# Patient Record
Sex: MALE | Race: ASIAN | NOT HISPANIC OR LATINO | Employment: FULL TIME | URBAN - METROPOLITAN AREA
[De-identification: names, ages, dates, MRNs, and addresses within clinical notes are randomized per-mention and may not be internally consistent; named-entity substitution may affect disease eponyms.]

---

## 2020-05-16 ENCOUNTER — OFFICE VISIT - HEALTHEAST (OUTPATIENT)
Dept: FAMILY MEDICINE | Facility: CLINIC | Age: 35
End: 2020-05-16

## 2020-05-16 ENCOUNTER — AMBULATORY - HEALTHEAST (OUTPATIENT)
Dept: FAMILY MEDICINE | Facility: CLINIC | Age: 35
End: 2020-05-16

## 2020-05-16 DIAGNOSIS — Z20.822 EXPOSURE TO COVID-19 VIRUS: ICD-10-CM

## 2020-05-17 ENCOUNTER — COMMUNICATION - HEALTHEAST (OUTPATIENT)
Dept: FAMILY MEDICINE | Facility: CLINIC | Age: 35
End: 2020-05-17

## 2020-05-17 ENCOUNTER — COMMUNICATION - HEALTHEAST (OUTPATIENT)
Dept: EMERGENCY MEDICINE | Facility: CLINIC | Age: 35
End: 2020-05-17

## 2020-05-18 ENCOUNTER — COMMUNICATION - HEALTHEAST (OUTPATIENT)
Dept: FAMILY MEDICINE | Facility: CLINIC | Age: 35
End: 2020-05-18

## 2020-06-01 ENCOUNTER — COMMUNICATION - HEALTHEAST (OUTPATIENT)
Dept: SCHEDULING | Facility: CLINIC | Age: 35
End: 2020-06-01

## 2021-03-04 ENCOUNTER — COMMUNICATION - HEALTHEAST (OUTPATIENT)
Dept: SCHEDULING | Facility: CLINIC | Age: 36
End: 2021-03-04

## 2021-06-08 NOTE — TELEPHONE ENCOUNTER
Coronavirus (COVID-19) Notification    Patient  Rodney Kim    Reason for call  Notify of Positive Coronavirus (COVID-19) lab results, assess symptoms,  review [facility] recommendations    Lab Result    Lab test:  2019-nCoV rRt-PCR or SARS-CoV-2 PCR    Oropharyngeal AND/OR nasopharyngeal swabs is POSITIVE for 2019-nCoV RNA/SARS-COV-2 PCR (COVID-19 virus)    RN Recommendations/Instructions per [facility] Coronavirus COVID-19 recommendations    Brief introduction script  Hi, My name is Xi JOE and I am calling on behalf of [employer].  We were notified that your Coronavirus test (COVID-19) for was POSITIVE for the virus.  I have some information to relay to you but first I wanted to mention that the [place] Dept of Health will be contacting you shortly [it's possible MD already called Patient] to talk to you more about how you are feeling and other people you have had contact with who might now also have the virus.  Also, [facility] is Partnering with the [facility] for Covid-19 research, you may be contacted directly by research staff.    Assessment (Inquire about Patient's current symptoms)  Pt is asymptomatic.  He lives with his significant other and his mother in law.  Pt's MIL tested positive for corona virus so it was advised that he too be tested.  Of note, pt has a  baby who is in the hospital.     If at time of call, Patients symptoms hare worsened, the Patient should contact 911 or have someone drive them to Emergency Dept promptly:      If Patient calling 911, inform 911 personal that you have tested positive for the Coronavirus (COVID-19).  Place mask on and await 911 to arrive.    If Emergency Dept, If possible, please have another adult drive you to the Emergency Dept but you need to wear mask when in contact with other people.      Review information with Patient    Since you tested POSITIVE for the COVID-19 virus, it is important that you protect others from being  exposed and infected with this virus.    [For safety, it's very important to follow these rules.]    First, stay home and away from others (self-isolate) until:    You've had no fever--and no medicine that reduces fever--for 3 full days (72 hours). And      Your other symptoms have gotten better. For example, your cough or breathing has improved. And     At least 10 days have passed since your symptoms started.    During this time:    Stay in your own room (and use your own bathroom), if you can.    Stay away from others in your home. No hugging, kissing or shaking hands.    Don't let anyone visit.    Don't go to work, school or anywhere else.     Clean  high touch  surfaces often (doorknobs, counters, handles, etc.). Use a household cleaning spray or wipes.    Cover your mouth and nose with a mask, tissue or washcloth to avoid spreading germs.    Wash your hands and face often with soap and water.    You should not go back to work until you meet the guidelines above for ending your home isolation. You should meet these along with any other guidelines that your employer has.  Employers: This document serves as formal notice of your employee's medical guidelines for going back to work. They must meet the above guidelines before going back to work in person.    How can I take care of myself?  1. Get lots of rest. Drink extra fluids (unless a doctor has told you not to).    2. Take Tylenol (acetaminophen) for fever or pain. If you have liver or kidney problems, ask your family doctor if it's okay to take Tylenol.     Take either:     650 mg (two 325 mg pills) every 4 to 6 hours, or     1,000 mg (two 500 mg pills) every 8 hours as needed.     Note: Don't take more than 3,000 mg in one day. Acetaminophen is found in many medicines (both prescribed and over-the-counter medicines). Read all labels to be sure you don't take too much.  For children, check the Tylenol bottle for the right dose. The dose is based on the  child's age or weight.  3. If you have other health problems (like cancer, heart failure, an organ transplant or severe kidney disease): Call your specialty clinic if you don't feel better in the next 2 days.    4. Know when to call 911: If your breathing is so bad that it keeps you from doing normal activities, call 911 or go to the emergency room. Tell them that you've been staying home and may have COVID-19.  5. Sign up for [employer]. We know it's scary to hear that you have COVID-19. We want to track your symptoms to make sure you're okay over the next 2 weeks. Please look for an email from [employer]--this is a free, online program that we'll use to keep in touch. To sign up, follow the link in the email. Learn more at [email or web address].    Where can I get more information?    To learn the [place]'s guidelines for staying home, please visit the [facility] of Health website at [email or web address].[facility].[place]/diseases/coronavirus/basics.html.    To learn more about COVID-19 and how to care for yourself at home, please visit the CDC website at https://www.cdc.gov/coronavirus/2019-ncov/about/steps-when-sick.html.    For more options for care at [facility], please visit our website at [email or web address]      [place] Dept of Health ([facility]) COVID-19 Hotline:   [phone]    Positive COVID-19 letter Sent (Yes/Yumi):  YES    Pt verbalized understanding of when to call MD and also self isolation protoccol    [Name]  Xi Castro RN

## 2021-06-08 NOTE — TELEPHONE ENCOUNTER
Had been Covid positive, tested on 5/16/20.  Requesting some form of letter as a return to work.  Has been quarantined for two weeks.  Did not ever have symptoms.  Advised that the positive letter that he had received should have criteria of when a person can discontinue quarantine.  The date of the test is on the paper, and he has been asymptomatic and quarantined for 14 days.  Advised that he could call back if his employers had an issue.      Coronavirus (COVID-19) Notification    Lab Result   Lab test 2019-nCoV rRt-PCR OR SARS-COV-2 PCR    Nasopharyngeal AND/OR Oropharyngeal swab is NEGATIVE for 2019-nCoV RNA [OR] SARS-COV-2 RNA (COVID-19) RNA    Your result was negative. This means that we didn't find the virus that causes COVID-19 in your sample.   A test may show negative when you do actually have the virus. This can happen when the virus is in the early stages of infection, before you feel illness symptoms.  Even if you don't have symptoms, they may still appear. For safety, it's very important to follow these rules.  Keep yourself away from others (self-isolation):    Stay home. Don't go to work, school or anywhere else.     Stay in your own room (and use your own bathroom), if you can.    Stay away from others in your home. No hugging, kissing or shaking hands. No visitors.    Clean  high touch  surfaces often (doorknobs, counters, handles, etc.). Use a household cleaning spray or wipes.    Cover your mouth and nose with a mask, tissue or washcloth to avoid spreading germs.    Wash your hands and face often with soap and water.  Stay in self-isolation until you meet ALL of the guidelines below:  1. You have had no fever for at least 72 hours (that is 3 full days of no fever without the use of medicine that reduces fevers), AND  2. other symptoms (such as cough, shortness of breath) have gotten better, AND  3. at least 10 days have passed since your symptoms first appeared.  Going back to work  Check with  your employer for any guidelines to follow for going back to work.  Employers: This document serves as formal notice that your employee tested negative for COVID-19, as of the testing date shown above.    If your symptoms worsen or other concerning symptoms, contact PCP, oncare or consider returning to Emergency Dept.    For questions regarding COVID19, Patient can access more information at:    https://www.cdc.gov/coronavirus/2019-ncov/faq.html    https://www.health.[facility].[place]/diseases/coronavirus/index.html    [facility] Hotline ([phone])

## 2021-06-20 NOTE — LETTER
Letter by Xi Castro RN at      Author: Xi Castro RN Service: -- Author Type: --    Filed:  Encounter Date: 5/17/2020 Status: (Other)       5/17/2020        Rodney Kim  [address]  [place] [address]    This letter provides a written record that you were tested for COVID-19 on 5/16/20.     Your result was positive.     This means that we found the virus that causes COVID-19 in your sample.    How can I protect others?    For safety, its very important to follow these rules.    First, stay home and away from others (self-isolate) until:      Youve had no fever--and no medicine that reduces fever--for 3 full days (72 hours). And?     Your other symptoms have gotten better. For example, your cough or breathing has improved. And?    At least 10 days have passed since your symptoms started.    During this time:      Stay in your own room (and use your own bathroom), if you can.    Stay away from others in your home. No hugging, kissing or shaking hands.    Dont let anyone visit.    Dont go to work, school or anywhere else.     Clean high touch surfaces often (doorknobs, counters, handles, etc.). Use a household cleaning spray or wipes.    Cover your mouth and nose with a mask, tissue or washcloth to avoid spreading germs.    Wash your hands and face often with soap and water.    You should not go back to work until you meet the guidelines above for ending your home isolation. You should meet these along with any other guidelines that your employer has.    Employers: This document serves as formal notice of your employees medical guidelines for going back to work. They must meet the above guidelines before going back to work in person.    How can I take care of myself?    1. Get lots of rest. Drink extra fluids (unless a doctor has told you not to).    2. Take Tylenol (acetaminophen) for fever or pain. If you have liver or kidney problems, ask your family doctor if its okay to take Tylenol.      Take either:     650 mg (two 325 mg pills) every 4 to 6 hours, or?    1,000 mg (two 500 mg pills) every 8 hours as needed.     Note: Dont take more than 3,000 mg in one day. Acetaminophen is found in many medicines (both prescribed and over-the-counter medicines). Read all labels to be sure you dont take too much.  For children, check the Tylenol bottle for the right dose. The dose is based on the radha age or weight.    1. If you have other health problems (like cancer, heart failure, an organ transplant or severe kidney disease): Call your specialty clinic if you dont feel better in the next 2 days.    2. Know when to call 911: If your breathing is so bad that it keeps you from doing normal activities, call 911 or go to the emergency room. Tell them that youve been staying home and may have COVID-19.    3. Sign up for [employer]. We know its scary to hear that you have COVID-19. We want to track your symptoms to make sure youre okay over the next 2 weeks. Please look for an email from [employer]--this is a free, online program that well use to keep in touch. To sign up, follow the link in the email. Learn more at [email or web address].    4. Interested is participating in research? Visit the link below to view current clinical trials that apply to your situation:  [email or web address]/[facility]-clinical-trials    Where can I get more information?    To learn the [facility] guidelines for staying home, please visit the [place] Department of Health website at https://www.health.state.[place]/diseases/coronavirus/basics.html.    To learn more about COVID-19 and how to care for yourself at home, please visit the CDC website at https://www.cdc.gov/coronavirus/2019-ncov/about/steps-when-sick.html.    For more options for care at [facility], please visit our website at [email or web address]

## 2023-04-09 ENCOUNTER — HOSPITAL ENCOUNTER (EMERGENCY)
Facility: HOSPITAL | Age: 38
Discharge: HOME | End: 2023-04-10
Attending: EMERGENCY MEDICINE | Admitting: EMERGENCY MEDICINE
Payer: COMMERCIAL

## 2023-04-09 DIAGNOSIS — S62.91XA CLOSED FRACTURE OF RIGHT HAND, INITIAL ENCOUNTER: ICD-10-CM

## 2023-04-09 PROCEDURE — 99284 EMERGENCY DEPT VISIT MOD MDM: CPT | Mod: 25

## 2023-04-09 PROCEDURE — 26600 TREAT METACARPAL FRACTURE: CPT | Mod: RT

## 2023-04-10 ENCOUNTER — APPOINTMENT (OUTPATIENT)
Dept: RADIOLOGY | Facility: HOSPITAL | Age: 38
End: 2023-04-10
Attending: EMERGENCY MEDICINE
Payer: COMMERCIAL

## 2023-04-10 VITALS
BODY MASS INDEX: 33.13 KG/M2 | WEIGHT: 180 LBS | OXYGEN SATURATION: 100 % | HEIGHT: 62 IN | DIASTOLIC BLOOD PRESSURE: 72 MMHG | TEMPERATURE: 97.6 F | SYSTOLIC BLOOD PRESSURE: 124 MMHG | RESPIRATION RATE: 20 BRPM | HEART RATE: 102 BPM

## 2023-04-10 PROCEDURE — 73130 X-RAY EXAM OF HAND: CPT | Mod: RT

## 2023-04-10 PROCEDURE — 250N000013 HC RX MED GY IP 250 OP 250 PS 637: Performed by: EMERGENCY MEDICINE

## 2023-04-10 RX ORDER — IBUPROFEN 600 MG/1
600 TABLET, FILM COATED ORAL EVERY 6 HOURS PRN
Qty: 30 TABLET | Refills: 0 | Status: SHIPPED | OUTPATIENT
Start: 2023-04-10

## 2023-04-10 RX ORDER — OXYCODONE HYDROCHLORIDE 5 MG/1
5 TABLET ORAL EVERY 6 HOURS PRN
Qty: 6 TABLET | Refills: 0 | Status: SHIPPED | OUTPATIENT
Start: 2023-04-10 | End: 2023-04-13

## 2023-04-10 RX ORDER — IBUPROFEN 600 MG/1
600 TABLET, FILM COATED ORAL ONCE
Status: COMPLETED | OUTPATIENT
Start: 2023-04-10 | End: 2023-04-10

## 2023-04-10 RX ADMIN — IBUPROFEN 600 MG: 600 TABLET, FILM COATED ORAL at 00:18

## 2023-04-10 ASSESSMENT — ENCOUNTER SYMPTOMS: WOUND: 1

## 2023-04-10 ASSESSMENT — ACTIVITIES OF DAILY LIVING (ADL): ADLS_ACUITY_SCORE: 35

## 2023-04-10 NOTE — ED PROVIDER NOTES
EMERGENCY DEPARTMENT ENCOUNTER      NAME: Rodney Kim  AGE: 37 year old male  YOB: 1985  MRN: [id number]  EVALUATION DATE & TIME: 4/9/2023 11:55 PM    PCP: No primary care provider on file.    ED PROVIDER: Dominique Munson M.D.      Chief Complaint   Patient presents with     Hand Injury         FINAL IMPRESSION:  1. Closed fracture of right hand, initial encounter        MEDICAL DECISION MAKING:    Pertinent Labs & Imaging studies reviewed. (See chart for details)  ED Course as of 04/10/23 0104   Mon Apr 10, 2023   0006 Afebrile.  Vital signs here mild tachycardia, otherwise unremarkable.  Patient was doing push-ups at home and had a 30 pound weight that was sitting up on its side and fell onto his right hand.  Happened just prior to arrival.  Has a small abrasion to the back of his hand as well.  No other injury.    Physical exam for patient here with slight swelling to the dorsum of his right hand at the base of his fifth finger with small abrasion noted in this area.  Has tenderness palpation at the base of his fifth finger.  No tenderness in his wrist, able to move first through fourth fingers without difficulty.  No other significant tenderness or swelling to the dorsum of his hand.    We will get an x-ray.  We will give ibuprofen.   0104 Patient's x-ray here shows a mildly displaced fifth metacarpal shaft fracture.  There is volar angulation.    This is the area where patient's pain is Tendinitis.  Due to a uploading area I am unable to see the actual images but this is per the radiology read and physical exam that correlates with this read.  Placed in an ulnar gutter splint and discharged with follow-up with [facility].         Medical Decision Making    History:    Supplemental history from: Documented in chart, if applicable    External Record(s) reviewed: Documented in chart, if applicable.    Work Up:    Chart documentation includes differential considered and any EKGs or imaging  independently interpreted by provider, where specified.    In additional to work up documented, I considered the following work up: Documented in chart, if applicable.    External consultation:    Discussion of management with another provider: Documented in chart, if applicable    Complicating factors:    Care impacted by chronic illness: N/A    Care affected by social determinants of health: N/A    Disposition considerations: Discharge. I prescribed additional prescription strength medication(s) as charted. N/A.      Critical care: 0 minutes excluding separately billable procedures.  Includes bedside management, time reviewing test results, review of records, discussing the case with staff, documenting the medical record and time spent with family members (or surrogate decision makers) discussing specific treatment issues.        ED COURSE:  12:03 AM I met with the patient, obtained history, performed an initial exam, and discussed options and plan for diagnostics and treatment here in the ED.   12:46 AM Reassessed and updated patient on his results. Performed splint procedure on patient's right hand.   1:00 AM I discussed the plan for discharge with the patient, and patient is agreeable. We discussed supportive cares at home and reasons for return to the ER including new or worsening symptoms - all questions and concerns addressed. Patient to be discharged by RN.      The importance of close follow up was discussed. We reviewed warning signs and symptoms, and I instructed Mr. Kim to return to the emergency department immediately if he develops any new or worsening symptoms. I provided additional verbal discharge instructions. Mr. Kim expressed understanding and agreement with this plan of care, his questions were answered, and he was discharged in stable condition.     MEDICATIONS GIVEN IN THE EMERGENCY:  Medications   ibuprofen (ADVIL/MOTRIN) tablet 600 mg (600 mg Oral $Given 4/10/23 0018)       NEW  PRESCRIPTIONS STARTED AT TODAY'S ER VISIT:  New Prescriptions    IBUPROFEN (ADVIL/MOTRIN) 600 MG TABLET    Take 1 tablet (600 mg) by mouth every 6 hours as needed for moderate pain    OXYCODONE (ROXICODONE) 5 MG TABLET    Take 1 tablet (5 mg) by mouth every 6 hours as needed for severe pain          =================================================================    HPI    Patient information was obtained from: Patient     Use of : N/A       Rodney Kim is a 37 year old male who presents with right hand injury. Patient reports he was doing pushups on the floor when a 30 pound weight that was sitting on its side fell over his right hand just prior to ED arrival. Patient reports having pain with movement in right pinky finger. Patient has a small laceration on top of his pinky knuckle. Bleeding is currently controlled. Patient denies any additional complaints at this time.     REVIEW OF SYSTEMS   Review of Systems   Musculoskeletal:        Positive for right hand pain.    Skin: Positive for wound.   All other systems reviewed and are negative.        PAST MEDICAL HISTORY:  No past medical history on file.    PAST SURGICAL HISTORY:  No past surgical history on file.    CURRENT MEDICATIONS:    No current facility-administered medications for this encounter.    Current Outpatient Medications:      ibuprofen (ADVIL/MOTRIN) 600 MG tablet, Take 1 tablet (600 mg) by mouth every 6 hours as needed for moderate pain, Disp: 30 tablet, Rfl: 0     oxyCODONE (ROXICODONE) 5 MG tablet, Take 1 tablet (5 mg) by mouth every 6 hours as needed for severe pain, Disp: 6 tablet, Rfl: 0     dicyclomine (BENTYL) 20 mg tablet, [DICYCLOMINE (BENTYL) 20 MG TABLET] Take 1 tablet (20 mg total) by mouth 3 (three) times a day as needed (abdominal cramping)., Disp: 21 tablet, Rfl: 0     ondansetron (ZOFRAN-ODT) 4 MG disintegrating tablet, [ONDANSETRON (ZOFRAN-ODT) 4 MG DISINTEGRATING TABLET] Take 1 tablet (4 mg total) by mouth every 8  "(eight) hours as needed for nausea., Disp: 24 tablet, Rfl: 0    ALLERGIES:  No Known Allergies    FAMILY HISTORY:  No family history on file.    SOCIAL HISTORY:   Social History     Socioeconomic History     Marital status: Single   Social History Narrative    Patient lives alone       PHYSICAL EXAM:    Vitals: BP (!) 158/90   Pulse 102   Temp 97.6  F (36.4  C) (Temporal)   Resp 18   Ht 1.575 m (5' 2\")   Wt 81.6 kg (180 lb)   SpO2 98%   BMI 32.92 kg/m     General:. Alert and interactive, comfortable appearing.  HENT: Oropharynx without erythema or exudates. MMM.  TMs clear bilaterally.  Eyes: Pupils mid-sized and equally reactive.   Neck: Full AROM.  No midline tenderness to palpation.  Cardiovascular: Regular rate and rhythm. Peripheral pulses 2+ bilaterally.  Chest/Pulmonary: Normal work of breathing. Lung sounds clear and equal throughout, no wheezes or crackles. No chest wall tenderness or deformities.  Abdomen: Soft, nondistended. Nontender without guarding or rebound.  Back/Spine: No CVA or midline tenderness.  Extremities: Slight swelling to the dorsum of his right hand at the base of his fifth finger with small abrasion noted in this area.  Has tenderness palpation at the base of his fifth finger.  No tenderness in his wrist, able to move first through fourth fingers without difficulty.  No other significant tenderness or swelling to the dorsum of his hand. No lower extremity edema.   Skin: Warm and dry. Normal skin color.   Neuro: Speech clear. CNs grossly intact. Moves all extremities appropriately. Strength and sensation grossly intact to all extremities.   Psych: Normal affect/mood, cooperative, memory appropriate.       RADIOLOGY:  XR Hand Right G/E 3 Views   Final Result   IMPRESSION: Mildly displaced fifth metacarpal shaft fracture with volar angulation.          PROCEDURES:     PROCEDURE: Splint Placement   INDICATIONS: right fifth metacarpal shaft fracture   PROCEDURE PROVIDER: Dr Shannan Munson "   NOTE:  A Ulnar gutter splint made of Orthoglass was applied to the Right upper extremity by the above provider. As noted in the physical exam, distal CMS was intact prior to placement. The splint was checked and the fit was adjusted to ensure proper positioning after placement. Sensation and circulation, as well as motor function, are unchanged after splint placement and the patient is more comfortable with the splint in place.           I, Ghanshyam Godoy, am serving as a scribe to document services personally performed by Dr. Dominique Munson  based on my observation and the provider's statements to me. I, Dominique Munson MD attest that Ghanshyam Godoy is acting in a scribe capacity, has observed my performance of the services and has documented them in accordance with my direction.      Dominique Munson M.D.  Emergency Medicine  [facility] EMERGENCY DEPARTMENT  [facility] [facility] [address]  [phone]  Dept: [phone]     Dominique Munson MD  04/10/23 0104

## 2023-04-10 NOTE — ED NOTES
Discussed discharge instructions and prescriptions with pt. Pt voices understanding. Pt left er ambulating with a steady gait with all belongings.

## 2023-04-10 NOTE — ED TRIAGE NOTES
Pt here for right hand injury while doing push-ups, a 30 lb dumbbell fell onto his hand. Small laceration noted on top of his pinky knuckle, bleeding control. Ese applied.      Triage Assessment       Row Name 04/09/23 2246       Triage Assessment (Adult)    Airway WDL WDL       Respiratory WDL    Respiratory WDL WDL

## 2023-04-10 NOTE — DISCHARGE INSTRUCTIONS
Please take Motrin for pain, for severe pain please take the oxycodone.  Make an appointment to follow-up with [facility] in the next week as you may need a cast placed.  Return for any new or worsening symptoms.  As we discussed leave the splint in place and keep the area dry.

## 2025-05-30 ENCOUNTER — APPOINTMENT (OUTPATIENT)
Dept: RADIOLOGY | Facility: HOSPITAL | Age: 40
End: 2025-05-30
Attending: STUDENT IN AN ORGANIZED HEALTH CARE EDUCATION/TRAINING PROGRAM

## 2025-05-30 ENCOUNTER — HOSPITAL ENCOUNTER (EMERGENCY)
Facility: HOSPITAL | Age: 40
Discharge: HOME | End: 2025-05-30
Attending: STUDENT IN AN ORGANIZED HEALTH CARE EDUCATION/TRAINING PROGRAM | Admitting: STUDENT IN AN ORGANIZED HEALTH CARE EDUCATION/TRAINING PROGRAM

## 2025-05-30 VITALS
TEMPERATURE: 98.7 F | DIASTOLIC BLOOD PRESSURE: 62 MMHG | SYSTOLIC BLOOD PRESSURE: 117 MMHG | RESPIRATION RATE: 16 BRPM | OXYGEN SATURATION: 97 % | HEART RATE: 82 BPM

## 2025-05-30 DIAGNOSIS — J40 BRONCHITIS: ICD-10-CM

## 2025-05-30 PROCEDURE — 99283 EMERGENCY DEPT VISIT LOW MDM: CPT

## 2025-05-30 PROCEDURE — 71046 X-RAY EXAM CHEST 2 VIEWS: CPT

## 2025-05-30 PROCEDURE — 250N000013 HC RX MED GY IP 250 OP 250 PS 637: Performed by: STUDENT IN AN ORGANIZED HEALTH CARE EDUCATION/TRAINING PROGRAM

## 2025-05-30 RX ORDER — BENZONATATE 100 MG/1
100 CAPSULE ORAL 3 TIMES DAILY PRN
Qty: 15 CAPSULE | Refills: 0 | Status: SHIPPED | OUTPATIENT
Start: 2025-05-30 | End: 2025-06-04

## 2025-05-30 RX ORDER — BENZONATATE 100 MG/1
100 CAPSULE ORAL ONCE
Status: COMPLETED | OUTPATIENT
Start: 2025-05-30 | End: 2025-05-30

## 2025-05-30 RX ORDER — PREDNISONE 20 MG/1
TABLET ORAL
Qty: 10 TABLET | Refills: 0 | Status: SHIPPED | OUTPATIENT
Start: 2025-05-30

## 2025-05-30 RX ADMIN — BENZONATATE 100 MG: 100 CAPSULE ORAL at 03:43

## 2025-05-30 ASSESSMENT — COLUMBIA-SUICIDE SEVERITY RATING SCALE - C-SSRS
2. HAVE YOU ACTUALLY HAD ANY THOUGHTS OF KILLING YOURSELF IN THE PAST MONTH?: NO
6. HAVE YOU EVER DONE ANYTHING, STARTED TO DO ANYTHING, OR PREPARED TO DO ANYTHING TO END YOUR LIFE?: NO
1. IN THE PAST MONTH, HAVE YOU WISHED YOU WERE DEAD OR WISHED YOU COULD GO TO SLEEP AND NOT WAKE UP?: NO

## 2025-05-30 ASSESSMENT — ACTIVITIES OF DAILY LIVING (ADL)
ADLS_ACUITY_SCORE: 41

## 2025-05-30 NOTE — ED PROVIDER NOTES
EMERGENCY DEPARTMENT ENCOUNTER      NAME: Rodney Kim  AGE: 40 year old male  YOB: 1985  MRN: [id number]  EVALUATION DATE & TIME: 5/30/2025  2:47 AM    PCP: System, Provider Not In    ED PROVIDER: Jose Bernabe MD      Chief Complaint   Patient presents with    Cough         FINAL IMPRESSION:  1. Bronchitis          ED COURSE & MEDICAL DECISION MAKING:    Pertinent Labs & Imaging studies reviewed. (See chart for details)  40 year old male presents to the Emergency Department for evaluation of cough    ED Course as of 05/30/25 0424   Fri May 30, 2025   0320 Patient is a 40-year-old male who presents to the emergency department with 2 weeks of cough.  No fevers.  Also endorses throat irritation.  No runny nose or ear pain or rashes.  He is well-appearing on exam without hypoxia or tachypnea or any distress.  Breath sounds are clear.  HEENT exam normal.  No leg swelling.  Differential diagnosis includes postnasal drip, viral respiratory illness, bacterial pneumonia.  Will obtain chest x-ray and provide Tessalon Perles and reevaluate   0415 Chest x-ray negative for acute cardiopulmonary process     4:24 AM Reassessed patient is feeling better.  Will discharge at this time with a prescription for prednisone and Tessalon Perles for bronchitis.  Discussed return precautions    Medical Decision Making  I independently interpreted the CXR and note no acute findings. See radiology report for final interpretation.  Discharge. I prescribed additional prescription strength medication(s) as charted. See documentation for any additional details.    MIPS (CTPE, Dental pain, [place], Sinusitis, Asthma/COPD, Head Trauma): Not Applicable    SEPSIS: None           At the conclusion of the encounter I discussed the results of all of the tests and the disposition. The questions were answered. The patient or family acknowledged understanding and was agreeable with the care plan.     0 minutes of critical care time  "    MEDICATIONS GIVEN IN THE EMERGENCY:  Medications   benzonatate (TESSALON) capsule 100 mg (100 mg Oral $Given 5/30/25 1620)       NEW PRESCRIPTIONS STARTED AT TODAY'S ER VISIT  New Prescriptions    BENZONATATE (TESSALON) 100 MG CAPSULE    Take 1 capsule (100 mg) by mouth 3 times daily as needed for cough.    PREDNISONE (DELTASONE) 20 MG TABLET    Take two tablets (= 40mg) each day for 5 (five) days          =================================================================    HPI    Patient information was obtained from: Patient    Use of : N/A       Rodney Kim is a 40 year old male with no pertinent history who presents to this ED by walk-in for evaluation of cough.    Patient reports onset of cough, shortness of breath, and \"itchy\" throat today.  Patient states he had this cough for the past 2 weeks.  No fevers.  No nausea, vomiting, or abdominal pain.  No rhinorrhea or otalgia.  No rashes.  Patient has been using over-the-counter cough medicine day and night along with cough drops.     No other symptoms, complaints, or concerns at this time.       PAST MEDICAL HISTORY:  No past medical history on file.    PAST SURGICAL HISTORY:  No past surgical history on file.        CURRENT MEDICATIONS:    benzonatate (TESSALON) 100 MG capsule  predniSONE (DELTASONE) 20 MG tablet  dicyclomine (BENTYL) 20 mg tablet  ibuprofen (ADVIL/MOTRIN) 600 MG tablet  ondansetron (ZOFRAN-ODT) 4 MG disintegrating tablet        ALLERGIES:  No Known Allergies    FAMILY HISTORY:  No family history on file.    SOCIAL HISTORY:   Social History     Socioeconomic History    Marital status: Single   Social History Narrative    Patient lives alone       VITALS:  /62   Pulse 82   Temp 98.7  F (37.1  C)   Resp 16   SpO2 97%     PHYSICAL EXAM    Physical Exam  Vitals and nursing note reviewed.   Constitutional:       General: He is not in acute distress.     Appearance: Normal appearance. He is normal weight. He is not " ill-appearing.   HENT:      Head: Normocephalic and atraumatic.      Nose: Nose normal. No congestion or rhinorrhea.      Mouth/Throat:      Mouth: Mucous membranes are moist.      Pharynx: Oropharynx is clear. No oropharyngeal exudate or posterior oropharyngeal erythema.   Eyes:      Extraocular Movements: Extraocular movements intact.      Conjunctiva/sclera: Conjunctivae normal.   Cardiovascular:      Rate and Rhythm: Normal rate and regular rhythm.      Pulses: Normal pulses.      Heart sounds: Normal heart sounds. No murmur heard.  Pulmonary:      Effort: Pulmonary effort is normal. No respiratory distress.      Breath sounds: Normal breath sounds.   Abdominal:      General: Abdomen is flat.   Musculoskeletal:         General: Normal range of motion.      Cervical back: Normal range of motion.      Right lower leg: No edema.      Left lower leg: No edema.   Skin:     General: Skin is warm and dry.      Capillary Refill: Capillary refill takes less than 2 seconds.   Neurological:      General: No focal deficit present.      Mental Status: He is alert and oriented to person, place, and time. Mental status is at baseline.   Psychiatric:         Mood and Affect: Mood normal.         Behavior: Behavior normal.         Thought Content: Thought content normal.         Judgment: Judgment normal.            LAB:  All pertinent labs reviewed and interpreted.  Results for orders placed or performed during the hospital encounter of 05/30/25   XR Chest 2 Views    Impression    IMPRESSION:  1.  Clear lungs.  2.  No pleural effusion or pneumothorax.  3.  Normal heart size.          RADIOLOGY:  Reviewed all pertinent imaging. Please see official radiology report.  XR Chest 2 Views   Final Result   IMPRESSION:   1.  Clear lungs.   2.  No pleural effusion or pneumothorax.   3.  Normal heart size.              PROCEDURES:   None      [facility] [employer] [facility] Documentation:   CMS Diagnoses: None             Corinne BROOKS am  serving as a scribe to document services personally performed by Jose Bernabe MD based on my observation and the provider's statements to me. I, Jose Bernabe MD, attest that Corinne Smalls is acting in a scribe capacity, has observed my performance of the services and has documented them in accordance with my direction.    Jose Bernabe MD  [facility] EMERGENCY DEPARTMENT  [address] [place] [id number]  [phone]      Jose Bernabe MD  05/30/25 0424

## 2025-05-30 NOTE — Clinical Note
Rodney Kim was seen and treated in our emergency department on 5/30/2025.  He may return to work on 06/01/2025.       If you have any questions or concerns, please don't hesitate to call.      Jose Bernabe MD

## 2025-05-30 NOTE — ED TRIAGE NOTES
Cough and throat irritation. Pt states cough makes him feel SOB. Had cough 2 weeks ago which subsided and tonight returned. Denies fevers.     Triage Assessment (Adult)       Row Name 05/30/25 0246          Triage Assessment    Airway WDL WDL        Respiratory WDL    Respiratory WDL cough     Cough Frequency frequent        Skin Circulation/Temperature WDL    Skin Circulation/Temperature WDL WDL        Peripheral/Neurovascular WDL    Peripheral Neurovascular WDL WDL        Cognitive/Neuro/Behavioral WDL    Cognitive/Neuro/Behavioral WDL WDL

## 2025-05-30 NOTE — DISCHARGE INSTRUCTIONS
Continue using over-the-counter medications to help with your cough, in addition to the prescribed medications to be used as directed.    Please return to the ER if your symptoms worsen.   Patient was sent in a 90 day supply. Called pharmacy to change to 30 day supply. Detail Level: Zone